# Patient Record
(demographics unavailable — no encounter records)

---

## 2025-04-30 NOTE — HISTORY OF PRESENT ILLNESS
[Joint Pain] : joint  [_______] : [unfilled] [___ yrs] : [unfilled] year(s) ago [8] : an average pain level of 8/10 [7] : a minimum pain level of 7/10 [9] : a maximum pain level of 9/10 [Sharp] : sharp [Aching] : aching [Sitting] : sitting [Standing] : standing [Walking] : walking [Other: ___] : [unfilled] [FreeTextEntry1] : Interval Note: sp right knee steroid injection 11/17/23 with many years of improvement in right knee pain.   Complains of right knee pain.  Pain is so bad that patient finds it difficult to perform adls and ambulate.  Since last visit the pain is improved. Denies any additional weakness, numbness, bowel/bladder dysfunction.    HPI  Ms. JOSE RAMOS is a 69 year F with no significant pmhx. C/o left lateral leg pain that radiates to feet. In addition notes bilateral knee pain and difficulty with ROM, right greater than left. Reports JACY in April with significant relief in symptoms until a few weeks ago. Pain is impacting her quality of life and ability to perform ADL's. Denies any additional weakness, numbness, bowel/bladder dysfunction, history of bleeding disorders.   Previous and current pain medications/doses/effects: None  Previous Pain Treatments:  None  Previous Pain Injections:  right knee steroid injection 11/17/23  Lumbar JACY (April 2023)  Previous Diagnostic Studies/Images:  XR Knee 11/23  Frontal oblique lateral and sunrise views of both knees from 11/16/2023 at 1025. No similar prior studies available for comparison.   IMPRESSION:  No fractures dislocations or joint effusions.   Bilateral knee tricompartment osteoarthritis. No joint margin erosions.   Fabellae present adjacent to both lateral femoral condyles.   Generalized mild osteopenia otherwise no discrete lytic or blastic lesions. MRI LS 1/23  LOCALIZER: No additional findings. BONES: There is no fracture or bone marrow edema. There are multilevel degenerative endplate changes with osteophyte formation, intervertebral disc space narrowing/desiccation, Schmorl's nodes and endplate irregularity. ALIGNMENT: Mild L4-L5 anterolisthesis SACROILIAC JOINTS/SACRUM: There is no sacral fracture. The SI joints are partially visualized but are intact. CONUS AND CAUDA EQUINA: The distal cord and conus are normal in signal. Conus terminates at T12-L1.  VISUALIZED INTRAPELVIC/INTRA-ABDOMINAL SOFT TISSUES: Normal. PARASPINAL SOFT TISSUES: Normal.   INDIVIDUAL LEVELS:  LOWER THORACIC SPINE: No spinal canal or neuroforaminal stenosis.  L1-L2: No spinal canal or neuroforaminal stenosis. L2-L3: No spinal canal or neuroforaminal stenosis. L3-L4: Disc bulge, bilateral facet arthropathy, ligamentous hypertrophy contributing to no significant canal stenosis and mild bilateral foraminal stenosis. L4-L5: Disc bulge with superimposed inferiorly extruded disc material, bilateral facet arthropathy, ligamentous hypertrophy contributing to severe canal stenosis and moderate bilateral foraminal stenosis. Severe bilateral lateral recess stenosis. L5-S1: Disc bulge, bilateral facet arthropathy, ligamentous hypertrophy contributing to no significant canal stenosis and moderate to severe bilateral foraminal stenosis. Moderate to severe bilateral lateral recess stenosis    IMPRESSION:   Multilevel lumbar spondylitic changes as above most notable at the L4-L5 level where there is anterolisthesis and inferiorly extruded disc material contributing to severe canal stenosis, moderate bilateral foraminal stenosis, involving exiting L3 nerve roots and severe bilateral lateral recess stenosis involving descending L4 nerve roots. [FreeTextEntry7] : Left hip pain, knee pain

## 2025-04-30 NOTE — ASSESSMENT
[FreeTextEntry1] : >> Imaging and Other Studies I personally reviewed the relevant imaging.  Discussed and explained to patient the likely source of pathology and pain.  Questions answered. MRI    >> Therapy and Other Modalities  >> Medications   acetaminophen 650mg q8h prn pain (caution <3g daily)  >> Interventions  Significant component of back and leg pain likely secondary to lumbar spinal stenosis demonstrated on MRI LS.  sp L5-S1 interlaminar epidural steroid injection   Progressive right knee pain secondary to osteoarthritis refractory to conservative treatments, will schedule right knee steroid injection r/b/a discussed   >> Consults  na   >> Discussion of Risks/Benefits/Alternatives  	>Regarding any scheduled procedures:  I have discussed in detail with the patient that any interventional pain procedure is associated with potential risks.  The procedure may include an injection of steroids and potentially other medications (local anesthetic and normal saline) into the epidural space or surrounding tissue of the spine.  There are significant risks of this procedure which include and are not limited to infection, bleeding, worsening pain, dural puncture leading to postdural puncture headache, nerve damage, spinal cord injury, paralysis, stroke, and death.    There is a chance that the procedure does not improve their pain.    There are risks associated with the steroid being absorbed into the body systemically.  These include dysphoria, difficulty sleeping, mood swings and personality changes.  Premenopausal women may notice an irregularity in her menstrual cycle for 2-3 months following the injection.  Steroids can specifically affect patients with hypertension, diabetes, and peptic ulcers.  The procedure may cause a temporary increase in blood pressure and blood pressure, and may adversely affect a peptic ulcer.  Other, more rare complications, include avascular necrosis of joints, glaucoma and worsening of osteoporosis.   I have discussed the risks of the procedure at length with the patient, and the potential benefits of pain relief.  I have offered alternatives to the procedure.  All questions were answered.    The patient expressed understanding and wishes to proceed with the procedure.  	>Regarding COVID19 Pandemic:   Any planned interventional pain procedure are scheduled because further delay may cause harm or negative outcome to patient.  The goal in performing this procedure is to avoid deterioration of function, emergency room visits (which increases exposure) and reliance on opioids.    r/b/a discussed with patient, lack of evidence to conclusively determine whether pain management procedures have any positive or negative impact on the possibility of avila the virus and/or development of any sequelae.   Patient counselled regarding timing steroid based intervention 2 weeks before or after COVID-19 vaccine administration to avoid any interaction or affect on efficacy of vaccination  Patient demonstrates understanding  Informed patient that risks associated with the COVID-19 infection.  Informed patient steps taken to limit the risks.  We are implementing safety precautions and following protocols consistent with the CDC and state recommendations. All patients and staff will be checked for fever or signs of illness upon entry to the facility. We will limit our steroid dose to the lowest effective therapeutic dose or in some cases steroids will not be injected at all.   Patient agrees to proceed  >> Conclusion  The above diagnosis and treatment plan is medically reasonable and necessary based on the patient encounter  There were no barriers to communication. Informed patient that I would be available for any additional questions. Patient was instructed to call with any worsening symptoms including severe pain, new numbness/weakness, or changes in the bowel/bladder function.  Discussed role of nsaids in pain management and all relevant risks, if patient is continuing to require after 4 weeks the patient should f/u for alternative treatment.  Instructed patient to maintain pain diary to monitor pain level, mobility, and function.

## 2025-04-30 NOTE — PHYSICAL EXAM
[Normal muscle bulk without asymmetry] : normal muscle bulk without asymmetry [Facet Tenderness] : no facet tenderness [Antalgic] : antalgic [] : Motor: [Normal] : Normal affect

## 2025-05-30 NOTE — PHYSICAL EXAM
[Normal muscle bulk without asymmetry] : normal muscle bulk without asymmetry [Normal] : Gait: normal [SLR] : positive straight leg raise [] : Motor: [Facet Tenderness] : no facet tenderness

## 2025-05-30 NOTE — HISTORY OF PRESENT ILLNESS
[Joint Pain] : joint  [_______] : [unfilled] [___ yrs] : [unfilled] year(s) ago [8] : an average pain level of 8/10 [7] : a minimum pain level of 7/10 [9] : a maximum pain level of 9/10 [Sharp] : sharp [Aching] : aching [Sitting] : sitting [Standing] : standing [Walking] : walking [Other: ___] : [unfilled] [FreeTextEntry1] : Interval Note:  sp  right knee steroid injection 5/7/25 with mild improvement in knee pain.  Reports that she has pain in bilateral leg pain.  Pain is so bad that patient finds it difficult to perform adls and ambulate.  Since last visit the pain is improved. Denies any additional weakness, numbness, bowel/bladder dysfunction.    HPI  Ms. JOSE RAMOS is a 69 year F with no significant pmhx. C/o left lateral leg pain that radiates to feet. In addition notes bilateral knee pain and difficulty with ROM, right greater than left. Reports JACY in April with significant relief in symptoms until a few weeks ago. Pain is impacting her quality of life and ability to perform ADL's. Denies any additional weakness, numbness, bowel/bladder dysfunction, history of bleeding disorders.   Previous and current pain medications/doses/effects: None  Previous Pain Treatments:  None  Previous Pain Injections:  right knee steroid injection 5/7/25 right knee steroid injection 11/17/23  Lumbar JACY (April 2023)  Previous Diagnostic Studies/Images:  XR Knee 11/23  Frontal oblique lateral and sunrise views of both knees from 11/16/2023 at 1025. No similar prior studies available for comparison.   IMPRESSION:  No fractures dislocations or joint effusions.   Bilateral knee tricompartment osteoarthritis. No joint margin erosions.   Fabellae present adjacent to both lateral femoral condyles.   Generalized mild osteopenia otherwise no discrete lytic or blastic lesions. MRI LS 1/23  LOCALIZER: No additional findings. BONES: There is no fracture or bone marrow edema. There are multilevel degenerative endplate changes with osteophyte formation, intervertebral disc space narrowing/desiccation, Schmorl's nodes and endplate irregularity. ALIGNMENT: Mild L4-L5 anterolisthesis SACROILIAC JOINTS/SACRUM: There is no sacral fracture. The SI joints are partially visualized but are intact. CONUS AND CAUDA EQUINA: The distal cord and conus are normal in signal. Conus terminates at T12-L1.  VISUALIZED INTRAPELVIC/INTRA-ABDOMINAL SOFT TISSUES: Normal. PARASPINAL SOFT TISSUES: Normal.   INDIVIDUAL LEVELS:  LOWER THORACIC SPINE: No spinal canal or neuroforaminal stenosis.  L1-L2: No spinal canal or neuroforaminal stenosis. L2-L3: No spinal canal or neuroforaminal stenosis. L3-L4: Disc bulge, bilateral facet arthropathy, ligamentous hypertrophy contributing to no significant canal stenosis and mild bilateral foraminal stenosis. L4-L5: Disc bulge with superimposed inferiorly extruded disc material, bilateral facet arthropathy, ligamentous hypertrophy contributing to severe canal stenosis and moderate bilateral foraminal stenosis. Severe bilateral lateral recess stenosis. L5-S1: Disc bulge, bilateral facet arthropathy, ligamentous hypertrophy contributing to no significant canal stenosis and moderate to severe bilateral foraminal stenosis. Moderate to severe bilateral lateral recess stenosis    IMPRESSION:   Multilevel lumbar spondylitic changes as above most notable at the L4-L5 level where there is anterolisthesis and inferiorly extruded disc material contributing to severe canal stenosis, moderate bilateral foraminal stenosis, involving exiting L3 nerve roots and severe bilateral lateral recess stenosis involving descending L4 nerve roots. [FreeTextEntry7] : Left hip pain, knee pain

## 2025-05-30 NOTE — ASSESSMENT
[FreeTextEntry1] : >> Imaging and Other Studies I personally reviewed the relevant imaging.  Discussed and explained to patient the likely source of pathology and pain.  Questions answered. MRI    >> Therapy and Other Modalities  >> Medications   trial gabapentin uptitrate to TID cautioned change in mood.  Encouraged to call with any worsening mood or depression/suicidal ideations  acetaminophen 650mg q8h prn pain (caution <3g daily)  >> Interventions  Significant component of back and leg pain likely secondary to lumbar spinal stenosis demonstrated on MRI LS.  sp L5-S1 interlaminar epidural steroid injection   given efficacy of previous intervention that is >80% improvement in pain and improved ability to perform adls, and return of pain despite conservative treatment, will schedule repeat L5-S1 interlaminar epidural steroid injection r/b/a discussed  Progressive right knee pain secondary to osteoarthritis refractory to conservative treatments, sp right knee steroid injection with mild improvement   >> Consults  na   >> Discussion of Risks/Benefits/Alternatives  	>Regarding any scheduled procedures:  I have discussed in detail with the patient that any interventional pain procedure is associated with potential risks.  The procedure may include an injection of steroids and potentially other medications (local anesthetic and normal saline) into the epidural space or surrounding tissue of the spine.  There are significant risks of this procedure which include and are not limited to infection, bleeding, worsening pain, dural puncture leading to postdural puncture headache, nerve damage, spinal cord injury, paralysis, stroke, and death.    There is a chance that the procedure does not improve their pain.    There are risks associated with the steroid being absorbed into the body systemically.  These include dysphoria, difficulty sleeping, mood swings and personality changes.  Premenopausal women may notice an irregularity in her menstrual cycle for 2-3 months following the injection.  Steroids can specifically affect patients with hypertension, diabetes, and peptic ulcers.  The procedure may cause a temporary increase in blood pressure and blood pressure, and may adversely affect a peptic ulcer.  Other, more rare complications, include avascular necrosis of joints, glaucoma and worsening of osteoporosis.   I have discussed the risks of the procedure at length with the patient, and the potential benefits of pain relief.  I have offered alternatives to the procedure.  All questions were answered.    The patient expressed understanding and wishes to proceed with the procedure.  	>Regarding COVID19 Pandemic:   Any planned interventional pain procedure are scheduled because further delay may cause harm or negative outcome to patient.  The goal in performing this procedure is to avoid deterioration of function, emergency room visits (which increases exposure) and reliance on opioids.    r/b/a discussed with patient, lack of evidence to conclusively determine whether pain management procedures have any positive or negative impact on the possibility of avila the virus and/or development of any sequelae.   Patient counselled regarding timing steroid based intervention 2 weeks before or after COVID-19 vaccine administration to avoid any interaction or affect on efficacy of vaccination  Patient demonstrates understanding  Informed patient that risks associated with the COVID-19 infection.  Informed patient steps taken to limit the risks.  We are implementing safety precautions and following protocols consistent with the CDC and state recommendations. All patients and staff will be checked for fever or signs of illness upon entry to the facility. We will limit our steroid dose to the lowest effective therapeutic dose or in some cases steroids will not be injected at all.   Patient agrees to proceed  >> Conclusion  The above diagnosis and treatment plan is medically reasonable and necessary based on the patient encounter  There were no barriers to communication. Informed patient that I would be available for any additional questions. Patient was instructed to call with any worsening symptoms including severe pain, new numbness/weakness, or changes in the bowel/bladder function.  Discussed role of nsaids in pain management and all relevant risks, if patient is continuing to require after 4 weeks the patient should f/u for alternative treatment.  Instructed patient to maintain pain diary to monitor pain level, mobility, and function.

## 2025-06-11 NOTE — HISTORY OF PRESENT ILLNESS
[Joint Pain] : joint  [_______] : [unfilled] [___ yrs] : [unfilled] year(s) ago [8] : an average pain level of 8/10 [7] : a minimum pain level of 7/10 [9] : a maximum pain level of 9/10 [Sharp] : sharp [Aching] : aching [Sitting] : sitting [Standing] : standing [Walking] : walking [Other: ___] : [unfilled] [FreeTextEntry1] : Interval Note: sp L5-S1 interlaminar epidural steroid injection 6/6/25 with improvement in right leg pain.  Patient reports she still has some soreness with ambulation and difficulty sitting in position for a prolong time.    Since last visit the pain is improved. Denies any additional weakness, numbness, bowel/bladder dysfunction.    HPI  Ms. JOSE RAMOS is a 69 year F with no significant pmhx. C/o left lateral leg pain that radiates to feet. In addition notes bilateral knee pain and difficulty with ROM, right greater than left. Reports JACY in April with significant relief in symptoms until a few weeks ago. Pain is impacting her quality of life and ability to perform ADL's. Denies any additional weakness, numbness, bowel/bladder dysfunction, history of bleeding disorders.   Previous and current pain medications/doses/effects: None  Previous Pain Treatments:  None  Previous Pain Injections:  L5-S1 interlaminar epidural steroid injection 6/6/25  right knee steroid injection 5/7/25 right knee steroid injection 11/17/23  Lumbar JACY (April 2023)  Previous Diagnostic Studies/Images:  XR Knee 11/23  Frontal oblique lateral and sunrise views of both knees from 11/16/2023 at 1025. No similar prior studies available for comparison.   IMPRESSION:  No fractures dislocations or joint effusions.   Bilateral knee tricompartment osteoarthritis. No joint margin erosions.   Fabellae present adjacent to both lateral femoral condyles.   Generalized mild osteopenia otherwise no discrete lytic or blastic lesions. MRI LS 1/23  LOCALIZER: No additional findings. BONES: There is no fracture or bone marrow edema. There are multilevel degenerative endplate changes with osteophyte formation, intervertebral disc space narrowing/desiccation, Schmorl's nodes and endplate irregularity. ALIGNMENT: Mild L4-L5 anterolisthesis SACROILIAC JOINTS/SACRUM: There is no sacral fracture. The SI joints are partially visualized but are intact. CONUS AND CAUDA EQUINA: The distal cord and conus are normal in signal. Conus terminates at T12-L1.  VISUALIZED INTRAPELVIC/INTRA-ABDOMINAL SOFT TISSUES: Normal. PARASPINAL SOFT TISSUES: Normal.   INDIVIDUAL LEVELS:  LOWER THORACIC SPINE: No spinal canal or neuroforaminal stenosis.  L1-L2: No spinal canal or neuroforaminal stenosis. L2-L3: No spinal canal or neuroforaminal stenosis. L3-L4: Disc bulge, bilateral facet arthropathy, ligamentous hypertrophy contributing to no significant canal stenosis and mild bilateral foraminal stenosis. L4-L5: Disc bulge with superimposed inferiorly extruded disc material, bilateral facet arthropathy, ligamentous hypertrophy contributing to severe canal stenosis and moderate bilateral foraminal stenosis. Severe bilateral lateral recess stenosis. L5-S1: Disc bulge, bilateral facet arthropathy, ligamentous hypertrophy contributing to no significant canal stenosis and moderate to severe bilateral foraminal stenosis. Moderate to severe bilateral lateral recess stenosis    IMPRESSION:   Multilevel lumbar spondylitic changes as above most notable at the L4-L5 level where there is anterolisthesis and inferiorly extruded disc material contributing to severe canal stenosis, moderate bilateral foraminal stenosis, involving exiting L3 nerve roots and severe bilateral lateral recess stenosis involving descending L4 nerve roots. [FreeTextEntry7] : Left hip pain, knee pain

## 2025-06-11 NOTE — ASSESSMENT
[FreeTextEntry1] : >> Imaging and Other Studies I personally reviewed the relevant imaging.  Discussed and explained to patient the likely source of pathology and pain.  Questions answered. MRI    >> Therapy and Other Modalities  PT  >> Medications   gabapentin uptitrate to TID cautioned change in mood.  Encouraged to call with any worsening mood or depression/suicidal ideations  acetaminophen 650mg q8h prn pain (caution <3g daily)  >> Interventions  Significant component of back and leg pain likely secondary to lumbar spinal stenosis demonstrated on MRI LS.  sp L5-S1 interlaminar epidural steroid injection    Progressive right knee pain secondary to osteoarthritis refractory to conservative treatments, sp right knee steroid injection with mild improvement   >> Consults  reasonable to be excused from jury duty  >> Discussion of Risks/Benefits/Alternatives  	>Regarding any scheduled procedures:  I have discussed in detail with the patient that any interventional pain procedure is associated with potential risks.  The procedure may include an injection of steroids and potentially other medications (local anesthetic and normal saline) into the epidural space or surrounding tissue of the spine.  There are significant risks of this procedure which include and are not limited to infection, bleeding, worsening pain, dural puncture leading to postdural puncture headache, nerve damage, spinal cord injury, paralysis, stroke, and death.    There is a chance that the procedure does not improve their pain.    There are risks associated with the steroid being absorbed into the body systemically.  These include dysphoria, difficulty sleeping, mood swings and personality changes.  Premenopausal women may notice an irregularity in her menstrual cycle for 2-3 months following the injection.  Steroids can specifically affect patients with hypertension, diabetes, and peptic ulcers.  The procedure may cause a temporary increase in blood pressure and blood pressure, and may adversely affect a peptic ulcer.  Other, more rare complications, include avascular necrosis of joints, glaucoma and worsening of osteoporosis.   I have discussed the risks of the procedure at length with the patient, and the potential benefits of pain relief.  I have offered alternatives to the procedure.  All questions were answered.    The patient expressed understanding and wishes to proceed with the procedure.  	>Regarding COVID19 Pandemic:   Any planned interventional pain procedure are scheduled because further delay may cause harm or negative outcome to patient.  The goal in performing this procedure is to avoid deterioration of function, emergency room visits (which increases exposure) and reliance on opioids.    r/b/a discussed with patient, lack of evidence to conclusively determine whether pain management procedures have any positive or negative impact on the possibility of avila the virus and/or development of any sequelae.   Patient counselled regarding timing steroid based intervention 2 weeks before or after COVID-19 vaccine administration to avoid any interaction or affect on efficacy of vaccination  Patient demonstrates understanding  Informed patient that risks associated with the COVID-19 infection.  Informed patient steps taken to limit the risks.  We are implementing safety precautions and following protocols consistent with the CDC and state recommendations. All patients and staff will be checked for fever or signs of illness upon entry to the facility. We will limit our steroid dose to the lowest effective therapeutic dose or in some cases steroids will not be injected at all.   Patient agrees to proceed  >> Conclusion  The above diagnosis and treatment plan is medically reasonable and necessary based on the patient encounter  There were no barriers to communication. Informed patient that I would be available for any additional questions. Patient was instructed to call with any worsening symptoms including severe pain, new numbness/weakness, or changes in the bowel/bladder function.  Discussed role of nsaids in pain management and all relevant risks, if patient is continuing to require after 4 weeks the patient should f/u for alternative treatment.  Instructed patient to maintain pain diary to monitor pain level, mobility, and function.  
Subdural hematoma

## 2025-07-30 NOTE — ASSESSMENT
[FreeTextEntry1] : #NUB- R shoulder #NUB- R lower back ddx lipoma vs fibromatosis vs other -discussed excision for removal. Reviewed risks including permanent scar in the area, dyspigmentation including hyper- and hypopigmentation, risk of infection and bleeding, risk of incomplete removal and recurrence. Reviewed aftercare, including no heavy lifting or vigorous exercise for 2 weeks. I can excise the smaller lesion on the R shoulder but she will need to see a surgeon for the R lower back referral sent  #Dermatitis favor ACD (recent outdoor exposure) -Start triamcinolone 0.1% cream BID to affected areas on the body until rash resolved, SED RTC if rash persistent, worsening  RTC PRN

## 2025-07-30 NOTE — HISTORY OF PRESENT ILLNESS
[FreeTextEntry1] : Bumps [de-identified] : 68 y/o female presents with concern of bumps  Location: right upper shoulder and right flank Duration: 1 year Symptoms: no pain or itch but it has been growing over time    Pt is also concerned about hives  Location: inner things  Duration: few days Treatment: eczema cream with no relief

## 2025-07-30 NOTE — PHYSICAL EXAM
[FreeTextEntry3] : Focused exam: -2.5 x 2.5 cm subcutaneous nodule on R shoulder 6 x 4cm firm mass on the R lateral back faint pink papules on the knees, thighs